# Patient Record
Sex: FEMALE | Race: ASIAN | NOT HISPANIC OR LATINO | Employment: UNEMPLOYED | URBAN - NONMETROPOLITAN AREA
[De-identification: names, ages, dates, MRNs, and addresses within clinical notes are randomized per-mention and may not be internally consistent; named-entity substitution may affect disease eponyms.]

---

## 2023-11-06 ENCOUNTER — OFFICE VISIT (OUTPATIENT)
Dept: FAMILY MEDICINE CLINIC | Age: 33
End: 2023-11-06
Payer: COMMERCIAL

## 2023-11-06 VITALS
BODY MASS INDEX: 24.3 KG/M2 | SYSTOLIC BLOOD PRESSURE: 112 MMHG | DIASTOLIC BLOOD PRESSURE: 80 MMHG | WEIGHT: 123.8 LBS | HEART RATE: 91 BPM | HEIGHT: 60 IN

## 2023-11-06 DIAGNOSIS — R79.0 LOW SERUM FERRITIN LEVEL: ICD-10-CM

## 2023-11-06 DIAGNOSIS — L30.9 ECZEMA, UNSPECIFIED TYPE: ICD-10-CM

## 2023-11-06 DIAGNOSIS — Z00.00 ANNUAL PHYSICAL EXAM: Primary | ICD-10-CM

## 2023-11-06 DIAGNOSIS — L65.9 HAIR LOSS: ICD-10-CM

## 2023-11-06 DIAGNOSIS — Z00.00 ENCOUNTER FOR MEDICAL EXAMINATION TO ESTABLISH CARE: ICD-10-CM

## 2023-11-06 PROCEDURE — 99385 PREV VISIT NEW AGE 18-39: CPT | Performed by: NURSE PRACTITIONER

## 2023-11-06 RX ORDER — TRIAMCINOLONE ACETONIDE 1 MG/G
1 CREAM TOPICAL 2 TIMES DAILY PRN
Qty: 80 G | Refills: 1 | Status: SHIPPED | OUTPATIENT
Start: 2023-11-06

## 2023-11-06 NOTE — PROGRESS NOTES
"Ainsley Jacob presents to [facility] with complaint of  Establish Care (Moved here from [place] ) and Annual Exam    SUBJECTIVE  History of Present Illness    Very pleasant patient is here to establish relations.  She moved here from [place].  She moved here due to her 's job relocating.  She does not have any children, has 3 husky dogs.  She is a stay-at-home wife.  Only chronic medical condition includes recent hair loss and eczema.  Patient says that she had labs completed last year that all came back normal except for low ferritin.  Patient was started on iron supplement for this reason.  Her ferritin level was 28, (patient was able to pull up lab report on her phone).  Other screening labs were normal.  Labs were completed last October.  Patient says she stopped iron supplement as she was not able to tell a difference in the amount of hair loss she was having.    Patient says she also uses Kenalog cream as needed for rash/eczema.  She is asking for this to be refilled today.    The following portions of the patient's history were reviewed and updated as appropriate: allergies, current medications, past family history, past medical history, past social history, past surgical history and problem list.  Patient's last Pap smear was last year and was normal.      OBJECTIVE  Vital Signs:   /80 (BP Location: Right arm, Patient Position: Sitting)   Pulse 91   Ht 152.4 cm (60\")   Wt 56.2 kg (123 lb 12.8 oz)   BMI 24.18 kg/m²       Physical Exam  Vitals reviewed.   Constitutional:       General: She is not in acute distress.     Appearance: Normal appearance. She is not ill-appearing.   HENT:      Head: Normocephalic and atraumatic.      Nose: Nose normal.      Mouth/Throat:      Mouth: Mucous membranes are moist.      Pharynx: Oropharynx is clear.   Cardiovascular:      Rate and Rhythm: Normal rate and regular rhythm.      Pulses: Normal pulses.      " Heart sounds: Normal heart sounds.   Pulmonary:      Effort: Pulmonary effort is normal.      Breath sounds: Normal breath sounds.   Musculoskeletal:      Cervical back: Neck supple.   Skin:     General: Skin is warm and dry.      Findings: Rash is crusting.      Comments: small area/quarter sized area of mild erythema and dry skin noted to lower abdomen.   Neurological:      General: No focal deficit present.      Mental Status: She is alert and oriented to person, place, and time. Mental status is at baseline.   Psychiatric:         Mood and Affect: Mood normal.         Behavior: Behavior normal.         Judgment: Judgment normal.              ASSESSMENT AND PLAN:  Diagnoses and all orders for this visit:    1. Annual physical exam (Primary)    2. Encounter for medical examination to establish care    3. Eczema, unspecified type  -     triamcinolone (KENALOG) 0.1 % cream; Apply 1 application  topically to the appropriate area as directed 2 (Two) Times a Day As Needed for Irritation or Rash.  Dispense: 80 g; Refill: 1    4. Low serum ferritin level    5. Hair loss        19 to 39: Counseling/Anticipatory Guidance Discussed: physical activity, healthy weight, misuse of tobacco, alcohol and drugs, dental health, mental health, and immunizations    Offered screening labs to patient, especially to recheck ferritin but she declines.  Also offered to refer to dermatology for concern over hair loss but she also declines.    Follow Up   Return in about 1 year (around 11/6/2024), or if symptoms worsen or fail to improve, for Annual physical. Patient to notify office with any acute concerns or issues.  Patient verbalizes understanding, agrees with plan of care and has no further questions upon discharge.     Patient was given instructions and counseling regarding her condition or for health maintenance advice. Please see specific information pulled into the AVS if appropriate.     Discussed the importance of following up  with any needed screening tests/labs/specialist appointments and any requested follow-up recommended by me today. Importance of maintaining follow-up discussed and patient accepts that missed appointments can delay diagnosis and potentially lead to worsening of conditions.    Part of this note may be an electronic transcription/translation of spoken language to printed text using the Dragon Dictation System.

## 2024-08-20 ENCOUNTER — LAB (OUTPATIENT)
Dept: LAB | Facility: HOSPITAL | Age: 34
End: 2024-08-20
Payer: COMMERCIAL

## 2024-08-20 ENCOUNTER — OFFICE VISIT (OUTPATIENT)
Dept: FAMILY MEDICINE CLINIC | Age: 34
End: 2024-08-20
Payer: COMMERCIAL

## 2024-08-20 VITALS
SYSTOLIC BLOOD PRESSURE: 119 MMHG | WEIGHT: 139.6 LBS | HEIGHT: 60 IN | BODY MASS INDEX: 27.41 KG/M2 | DIASTOLIC BLOOD PRESSURE: 88 MMHG | OXYGEN SATURATION: 100 % | HEART RATE: 82 BPM | TEMPERATURE: 98 F

## 2024-08-20 DIAGNOSIS — R63.5 WEIGHT GAIN: ICD-10-CM

## 2024-08-20 DIAGNOSIS — Z63.8 STRESS DUE TO FAMILY TENSION: ICD-10-CM

## 2024-08-20 DIAGNOSIS — Z01.419 ENCOUNTER FOR CERVICAL PAP SMEAR WITH PELVIC EXAM: Primary | ICD-10-CM

## 2024-08-20 LAB
ALBUMIN SERPL-MCNC: 4.5 G/DL (ref 3.5–5.2)
ALBUMIN/GLOB SERPL: 1.6 G/DL
ALP SERPL-CCNC: 54 U/L (ref 39–117)
ALT SERPL W P-5'-P-CCNC: 16 U/L (ref 1–33)
ANION GAP SERPL CALCULATED.3IONS-SCNC: 10 MMOL/L (ref 5–15)
AST SERPL-CCNC: 19 U/L (ref 1–32)
BASOPHILS # BLD AUTO: 0.05 10*3/MM3 (ref 0–0.2)
BASOPHILS NFR BLD AUTO: 0.9 % (ref 0–1.5)
BILIRUB SERPL-MCNC: 0.4 MG/DL (ref 0–1.2)
BUN SERPL-MCNC: 9 MG/DL (ref 6–20)
BUN/CREAT SERPL: 13.4 (ref 7–25)
CALCIUM SPEC-SCNC: 9.1 MG/DL (ref 8.6–10.5)
CHLORIDE SERPL-SCNC: 102 MMOL/L (ref 98–107)
CO2 SERPL-SCNC: 24 MMOL/L (ref 22–29)
CREAT SERPL-MCNC: 0.67 MG/DL (ref 0.57–1)
DEPRECATED RDW RBC AUTO: 40.5 FL (ref 37–54)
EGFRCR SERPLBLD CKD-EPI 2021: 116.3 ML/MIN/1.73
EOSINOPHIL # BLD AUTO: 0.21 10*3/MM3 (ref 0–0.4)
EOSINOPHIL NFR BLD AUTO: 3.8 % (ref 0.3–6.2)
ERYTHROCYTE [DISTWIDTH] IN BLOOD BY AUTOMATED COUNT: 12.2 % (ref 12.3–15.4)
GLOBULIN UR ELPH-MCNC: 2.9 GM/DL
GLUCOSE SERPL-MCNC: 94 MG/DL (ref 65–99)
HCT VFR BLD AUTO: 43.7 % (ref 34–46.6)
HGB BLD-MCNC: 14.5 G/DL (ref 12–15.9)
IMM GRANULOCYTES # BLD AUTO: 0.01 10*3/MM3 (ref 0–0.05)
IMM GRANULOCYTES NFR BLD AUTO: 0.2 % (ref 0–0.5)
LYMPHOCYTES # BLD AUTO: 2 10*3/MM3 (ref 0.7–3.1)
LYMPHOCYTES NFR BLD AUTO: 35.7 % (ref 19.6–45.3)
MCH RBC QN AUTO: 30 PG (ref 26.6–33)
MCHC RBC AUTO-ENTMCNC: 33.2 G/DL (ref 31.5–35.7)
MCV RBC AUTO: 90.5 FL (ref 79–97)
MONOCYTES # BLD AUTO: 0.38 10*3/MM3 (ref 0.1–0.9)
MONOCYTES NFR BLD AUTO: 6.8 % (ref 5–12)
NEUTROPHILS NFR BLD AUTO: 2.95 10*3/MM3 (ref 1.7–7)
NEUTROPHILS NFR BLD AUTO: 52.6 % (ref 42.7–76)
PLATELET # BLD AUTO: 339 10*3/MM3 (ref 140–450)
PMV BLD AUTO: 9.4 FL (ref 6–12)
POTASSIUM SERPL-SCNC: 3.9 MMOL/L (ref 3.5–5.2)
PROT SERPL-MCNC: 7.4 G/DL (ref 6–8.5)
RBC # BLD AUTO: 4.83 10*6/MM3 (ref 3.77–5.28)
SODIUM SERPL-SCNC: 136 MMOL/L (ref 136–145)
TSH SERPL DL<=0.05 MIU/L-ACNC: 2.18 UIU/ML (ref 0.27–4.2)
WBC NRBC COR # BLD AUTO: 5.6 10*3/MM3 (ref 3.4–10.8)

## 2024-08-20 PROCEDURE — 36415 COLL VENOUS BLD VENIPUNCTURE: CPT

## 2024-08-20 PROCEDURE — 87624 HPV HI-RISK TYP POOLED RSLT: CPT | Performed by: NURSE PRACTITIONER

## 2024-08-20 PROCEDURE — G0123 SCREEN CERV/VAG THIN LAYER: HCPCS | Performed by: NURSE PRACTITIONER

## 2024-08-20 PROCEDURE — 80050 GENERAL HEALTH PANEL: CPT

## 2024-08-20 SDOH — SOCIAL STABILITY - SOCIAL INSECURITY: OTHER SPECIFIED PROBLEMS RELATED TO PRIMARY SUPPORT GROUP: Z63.8

## 2024-08-20 NOTE — PROGRESS NOTES
"Chief Complaint  Gynecologic Exam (Also discuss weight gain )    Subjective      Ainsley Jacob is a 34 year old female that presents to [facility] for pap exam. She is also concerned for weight gain. States she exercises and does intermittent fasting. States she does not eat fast food and does not eat much bread or rice. States she gets on the treadmill and lifts weights. States she feels bloated and full quickly with every meal no matter what she eats. She denies nausea, vomiting, diarrhea or constipation. Reports daily bowel movements.  She does admit to a lot of stress from her mother. States she calls her daily and complains about various things even when she tells her she doesn't want to talk about them. She states that her mother also tells her she is fat and needs to lose weight every time she sees her in person. States family in [place] say the same things when she visits. States her parents also pull her into their arguments even though she has nothing to do with it. This seems to be causing her a lot of unnecessary stress/anxiety.      History of Present Illness    Current Outpatient Medications   Medication Instructions    triamcinolone (KENALOG) 0.1 % cream 1 application , Topical, 2 Times Daily PRN       The following portions of the patient's history were reviewed and updated as appropriate: allergies, current medications, past family history, past medical history, past social history, past surgical history, and problem list.    Objective   Vital Signs:   /88 (BP Location: Right arm, Patient Position: Sitting)   Pulse 82   Temp 98 °F (36.7 °C) (Oral)   Ht 152.4 cm (60\")   Wt 63.3 kg (139 lb 9.6 oz)   SpO2 100%   BMI 27.26 kg/m²     Wt Readings from Last 3 Encounters:   08/20/24 63.3 kg (139 lb 9.6 oz)   11/06/23 56.2 kg (123 lb 12.8 oz)     BP Readings from Last 3 Encounters:   08/20/24 119/88   11/06/23 112/80     Physical Exam  Vitals and nursing note " reviewed. Exam conducted with a chaperone present (jose d blue MA).   Constitutional:       Appearance: Normal appearance. She is not ill-appearing.   HENT:      Head: Normocephalic and atraumatic.   Cardiovascular:      Rate and Rhythm: Normal rate and regular rhythm.      Heart sounds: Normal heart sounds.   Pulmonary:      Effort: Pulmonary effort is normal.   Chest:   Breasts:     Breasts are symmetrical.      Right: Normal.      Left: Normal.   Genitourinary:     General: Normal vulva.      Exam position: Lithotomy position.      Pubic Area: No rash.       Vagina: Vaginal discharge present.      Cervix: Normal. No friability or erythema.      Uterus: Normal.       Adnexa: Right adnexa normal and left adnexa normal.      Rectum: Normal.   Lymphadenopathy:      Upper Body:      Right upper body: No axillary adenopathy.      Left upper body: No axillary adenopathy.   Skin:     General: Skin is warm and dry.      Capillary Refill: Capillary refill takes less than 2 seconds.   Neurological:      Mental Status: She is alert and oriented to person, place, and time.   Psychiatric:         Mood and Affect: Mood normal. Affect is tearful.         Behavior: Behavior normal.      Comments: Tearful when talking about her mother          Result Review :  The following data was reviewed by: SANDY Banda on 08/20/2024:      Common labs          8/20/2024    10:49   Common Labs   WBC 5.60    Hemoglobin 14.5    Hematocrit 43.7    Platelets 339        Lab Results (last 72 hours)       Procedure Component Value Units Date/Time    Comprehensive metabolic panel [[id number]] Collected: 08/20/24 1049    Specimen: Blood Updated: 08/20/24 1049    TSH Rfx On Abnormal To Free T4 [[id number]] Collected: 08/20/24 1049    Specimen: Blood Updated: 08/20/24 1049    CBC w AUTO Differential [[id number]]  (Abnormal) Collected: 08/20/24 1049    Specimen: Blood Updated: 08/20/24 1059    Narrative:      The following orders were  "created for panel order CBC w AUTO Differential.  Procedure                               Abnormality         Status                     ---------                               -----------         ------                     CBC Auto Differential[[id number]]        Abnormal            Final result                 Please view results for these tests on the individual orders.    CBC Auto Differential [[id number]]  (Abnormal) Collected: 08/20/24 1049    Specimen: Blood Updated: 08/20/24 1059     WBC 5.60 10*3/mm3      RBC 4.83 10*6/mm3      Hemoglobin 14.5 g/dL      Hematocrit 43.7 %      MCV 90.5 fL      MCH 30.0 pg      MCHC 33.2 g/dL      RDW 12.2 %      RDW-SD 40.5 fl      MPV 9.4 fL      Platelets 339 10*3/mm3      Neutrophil % 52.6 %      Lymphocyte % 35.7 %      Monocyte % 6.8 %      Eosinophil % 3.8 %      Basophil % 0.9 %      Immature Grans % 0.2 %      Neutrophils, Absolute 2.95 10*3/mm3      Lymphocytes, Absolute 2.00 10*3/mm3      Monocytes, Absolute 0.38 10*3/mm3      Eosinophils, Absolute 0.21 10*3/mm3      Basophils, Absolute 0.05 10*3/mm3      Immature Grans, Absolute 0.01 10*3/mm3     IGP, Aptima HPV, Rfx 16 / 18,45 [[id number]] Collected: 08/20/24 1202    Specimen: ThinPrep Vial from Cervix Updated: 08/20/24 1227             No Images in the past 120 days found..    No results found for: \"SARSANTIGEN\", \"COVID19\", \"RAPFLUA\", \"RAPFLUB\", \"FLUAAG\", \"FLUABDAG\", \"FLU\", \"FLUBAG\", \"RAPSCRN\", \"STREPAAG\", \"RSV\", \"POCPREGUR\", \"MONOSPOT\", \"INR\", \"LEADCAPBLD\", \"POCLEAD\", \"BILIRUBINUR\", \"POCGLU\"    Procedures        Assessment and Plan   Diagnoses and all orders for this visit:    1. Encounter for cervical Pap smear with pelvic exam (Primary)  -     IGP, Aptima HPV, Rfx 16 / 18,45; Future  -     IGP, Aptima HPV, Rfx 16 / 18,45    2. Weight gain  -     TSH Rfx On Abnormal To Free T4; Future  -     CBC w AUTO Differential; Future  -     Comprehensive metabolic panel; Future    3. Stress due to family " tension  Comments:  advised patient to set healthy boundaries with her mother. Encouraged counseling as well since mental health can impact physical health.                  There are no discontinued medications.       Follow Up   No follow-ups on file.  Patient was given instructions and counseling regarding her condition or for health maintenance advice. Please see specific information pulled into the AVS if appropriate.       Mell Kilpatrick, SANDY  08/20/24  14:18 EDT

## 2024-08-20 NOTE — PATIENT INSTRUCTIONS
Please seek counseling/therapy to help manage your stress and relationship with your mother. I believe this would be very beneficial as mental health affects physical health.    Also as discussed, please log your meals in an martha such as myfitness pal to keep up with your calories, carbs and protein. Goal protein intake of at least 100 grams per day. Also, be sure your are drinking plenty of water.

## 2024-08-24 LAB
CYTOLOGIST CVX/VAG CYTO: NORMAL
CYTOLOGY CVX/VAG DOC CYTO: NORMAL
CYTOLOGY CVX/VAG DOC THIN PREP: NORMAL
DX ICD CODE: NORMAL
HPV GENOTYPE REFLEX: NORMAL
HPV I/H RISK 4 DNA CVX QL PROBE+SIG AMP: NEGATIVE
Lab: NORMAL
OTHER STN SPEC: NORMAL
STAT OF ADQ CVX/VAG CYTO-IMP: NORMAL

## 2025-01-29 DIAGNOSIS — L30.9 ECZEMA, UNSPECIFIED TYPE: ICD-10-CM

## 2025-01-29 RX ORDER — TRIAMCINOLONE ACETONIDE 1 MG/G
CREAM TOPICAL
Qty: 80 G | Refills: 1 | OUTPATIENT
Start: 2025-01-29

## 2025-06-04 ENCOUNTER — OFFICE VISIT (OUTPATIENT)
Dept: FAMILY MEDICINE CLINIC | Age: 35
End: 2025-06-04
Payer: COMMERCIAL

## 2025-06-04 VITALS
BODY MASS INDEX: 25.6 KG/M2 | SYSTOLIC BLOOD PRESSURE: 123 MMHG | OXYGEN SATURATION: 100 % | HEIGHT: 60 IN | TEMPERATURE: 98 F | DIASTOLIC BLOOD PRESSURE: 85 MMHG | HEART RATE: 87 BPM | WEIGHT: 130.4 LBS

## 2025-06-04 DIAGNOSIS — R13.10 DYSPHAGIA, UNSPECIFIED TYPE: ICD-10-CM

## 2025-06-04 DIAGNOSIS — Z00.00 ANNUAL PHYSICAL EXAM: Primary | ICD-10-CM

## 2025-06-04 DIAGNOSIS — Z29.89 NEED FOR MALARIA PROPHYLAXIS: ICD-10-CM

## 2025-06-04 DIAGNOSIS — L30.9 ECZEMA, UNSPECIFIED TYPE: ICD-10-CM

## 2025-06-04 RX ORDER — DOXYCYCLINE HYCLATE 100 MG
100 TABLET ORAL DAILY
Qty: 60 TABLET | Refills: 0 | Status: SHIPPED | OUTPATIENT
Start: 2025-06-04

## 2025-06-04 RX ORDER — OMEPRAZOLE 40 MG/1
40 CAPSULE, DELAYED RELEASE ORAL DAILY
Qty: 30 CAPSULE | Refills: 0 | Status: SHIPPED | OUTPATIENT
Start: 2025-06-04

## 2025-06-04 RX ORDER — TRIAMCINOLONE ACETONIDE 1 MG/G
1 CREAM TOPICAL 2 TIMES DAILY PRN
Qty: 80 G | Refills: 1 | Status: SHIPPED | OUTPATIENT
Start: 2025-06-04

## 2025-06-04 NOTE — PROGRESS NOTES
Chief Complaint  Ainsley Jacob presents to [facility] for Annual Exam    Subjective          History of Present Illness    Ainsley is here today for preventive exam and to establish with new providers prior provider left office.  Declines COVID, Tdap vaccines.  Pap smear 8/20/2024 normal cytology, normal HPV.  Former smoker.  Quit in 2018.  She is requesting refill on her triamcinolone cream that she uses for eczema. She has been without her medication for several months.   She is going to be traveling to [facility] to visit family in 3 months for 1 month.   Feels like pills are getting stuck in throat. This just started 1-2 months ago.    Review of Systems   Constitutional:  Negative for chills and fever.   HENT:  Negative for ear pain and sore throat.    Eyes:  Negative for blurred vision and redness.   Respiratory:  Negative for shortness of breath and wheezing.    Cardiovascular:  Negative for chest pain and palpitations.   Gastrointestinal:  Negative for abdominal pain and vomiting.   Genitourinary:  Negative for frequency and urgency.   Skin:  Positive for rash (eczema).   Neurological:  Negative for seizures and syncope.   Psychiatric/Behavioral:  Negative for suicidal ideas and depressed mood.          No Known Allergies   History reviewed. No pertinent past medical history.  Current Outpatient Medications   Medication Sig Dispense Refill    triamcinolone (KENALOG) 0.1 % cream Apply 1 Application topically to the appropriate area as directed 2 (Two) Times a Day As Needed for Irritation or Rash. 80 g 1    doxycycline (VIBRAMYICN) 100 MG tablet Take 1 tablet by mouth Daily. Start 1-2 days prior to exposure and continue for 4 weeks after exposure. No dairy products within 2 hours of a dose 60 tablet 0    omeprazole (priLOSEC) 40 MG capsule Take 1 capsule by mouth Daily. 30 capsule 0     No current facility-administered medications for this visit.     History reviewed. No pertinent  "surgical history.   Social History     Tobacco Use    Smoking status: Former     Current packs/day: 0.00     Average packs/day: 0.5 packs/day for 14.0 years (7.0 ttl pk-yrs)     Types: Cigarettes     Start date:      Quit date: 2018     Years since quittin.4    Smokeless tobacco: Never   Vaping Use    Vaping status: Former    Start date: 2020    Quit date: 2018    Substances: Nicotine    Devices: Disposable   Substance Use Topics    Alcohol use: Not Currently    Drug use: Never     Family History   Problem Relation Age of Onset    Hypertension Mother     Diabetes Mother     Diabetes Father     Hypertension Father      Health Maintenance Due   Topic Date Due    ANNUAL PHYSICAL  2024      Immunization History   Administered Date(s) Administered    HPV Quadrivalent 2007, 2009    Hepatitis B Adolescent High Risk Infant 08/10/1999    IPV 1999    MMR 08/10/1999    Td (TDVAX) 1999    Varicella 08/10/1999        Objective     Vitals:    25 1520   BP: 123/85   Pulse: 87   Temp: 98 °F (36.7 °C)   TempSrc: Oral   SpO2: 100%   Weight: 59.1 kg (130 lb 6.4 oz)   Height: 152.4 cm (60\")     Body mass index is 25.47 kg/m².                No results found.    Physical Exam  Vitals reviewed.   Constitutional:       General: She is not in acute distress.     Appearance: Normal appearance. She is well-developed.   HENT:      Head: Normocephalic and atraumatic.      Right Ear: Hearing normal.      Left Ear: Hearing normal.      Mouth/Throat:      Mouth: Mucous membranes are moist.   Eyes:      Extraocular Movements: Extraocular movements intact.      Pupils: Pupils are equal, round, and reactive to light.   Cardiovascular:      Rate and Rhythm: Normal rate and regular rhythm.      Pulses: Normal pulses.      Heart sounds: Normal heart sounds.   Pulmonary:      Effort: Pulmonary effort is normal.      Breath sounds: Normal breath sounds.   Abdominal:      General: Bowel sounds are " normal.      Palpations: Abdomen is soft.      Tenderness: There is no abdominal tenderness.   Musculoskeletal:         General: Normal range of motion.      Cervical back: Normal range of motion and neck supple.   Skin:     General: Skin is warm and dry.   Neurological:      Mental Status: She is alert and oriented to person, place, and time.   Psychiatric:         Mood and Affect: Mood normal.           Result Review :                               Assessment and Plan      Assessment & Plan  Annual physical exam  Appropriate screenings and vaccinations were reviewed with the pt and offered as indicated.  Pt counseled on healthy lifestyle including healthy diet, exercise.         Eczema, unspecified type  Refill proved on triamcinolone.   Orders:    triamcinolone (KENALOG) 0.1 % cream; Apply 1 Application topically to the appropriate area as directed 2 (Two) Times a Day As Needed for Irritation or Rash.    Dysphagia, unspecified type  Trial Ppi. Will consider need for possible scope if persistent or worsening symptoms.   Orders:    omeprazole (priLOSEC) 40 MG capsule; Take 1 capsule by mouth Daily.    Need for malaria prophylaxis  Malaria prophylaxis sent. Also reviewed CDC travel vaccine recommendations with patient. She will check on status of vaccines and can return to update as needed.   Orders:    doxycycline (VIBRAMYICN) 100 MG tablet; Take 1 tablet by mouth Daily. Start 1-2 days prior to exposure and continue for 4 weeks after exposure. No dairy products within 2 hours of a dose              Follow Up     Return in about 1 year (around 6/4/2026) for Annual physical.

## 2025-06-12 DIAGNOSIS — L30.9 ECZEMA, UNSPECIFIED TYPE: ICD-10-CM

## 2025-06-12 RX ORDER — TRIAMCINOLONE ACETONIDE 1 MG/G
1 CREAM TOPICAL 2 TIMES DAILY PRN
Qty: 80 G | Refills: 2 | Status: SHIPPED | OUTPATIENT
Start: 2025-06-12

## 2025-06-30 DIAGNOSIS — R13.10 DYSPHAGIA, UNSPECIFIED TYPE: ICD-10-CM

## 2025-07-01 RX ORDER — OMEPRAZOLE 40 MG/1
40 CAPSULE, DELAYED RELEASE ORAL DAILY
Qty: 90 CAPSULE | Refills: 0 | Status: SHIPPED | OUTPATIENT
Start: 2025-07-01

## 2025-08-19 ENCOUNTER — OFFICE VISIT (OUTPATIENT)
Dept: FAMILY MEDICINE CLINIC | Age: 35
End: 2025-08-19
Payer: COMMERCIAL

## 2025-08-19 VITALS
HEIGHT: 60 IN | SYSTOLIC BLOOD PRESSURE: 118 MMHG | BODY MASS INDEX: 26.03 KG/M2 | DIASTOLIC BLOOD PRESSURE: 82 MMHG | WEIGHT: 132.6 LBS | HEART RATE: 100 BPM | TEMPERATURE: 98.1 F | OXYGEN SATURATION: 100 %

## 2025-08-19 DIAGNOSIS — K64.9 HEMORRHOIDS, UNSPECIFIED HEMORRHOID TYPE: Primary | ICD-10-CM

## 2025-08-19 DIAGNOSIS — L85.8 KP (KERATOSIS PILARIS): ICD-10-CM

## 2025-08-19 DIAGNOSIS — K59.00 CONSTIPATION, UNSPECIFIED CONSTIPATION TYPE: ICD-10-CM

## 2025-08-19 DIAGNOSIS — Z29.89 NEED FOR MALARIA PROPHYLAXIS: ICD-10-CM

## 2025-08-19 DIAGNOSIS — L30.9 ECZEMA, UNSPECIFIED TYPE: ICD-10-CM

## 2025-08-19 PROCEDURE — 99214 OFFICE O/P EST MOD 30 MIN: CPT | Performed by: NURSE PRACTITIONER

## 2025-08-19 RX ORDER — TRIAMCINOLONE ACETONIDE 5 MG/G
1 OINTMENT TOPICAL 2 TIMES DAILY
Qty: 45 G | Refills: 1 | Status: SHIPPED | OUTPATIENT
Start: 2025-08-19

## 2025-08-19 RX ORDER — DOCUSATE SODIUM 100 MG/1
100 CAPSULE, LIQUID FILLED ORAL DAILY
Qty: 30 CAPSULE | Refills: 0 | Status: SHIPPED | OUTPATIENT
Start: 2025-08-19

## 2025-08-19 RX ORDER — DOXYCYCLINE HYCLATE 100 MG
TABLET ORAL
Qty: 60 TABLET | Refills: 0 | OUTPATIENT
Start: 2025-08-19

## 2025-08-19 RX ORDER — PRAMOXINE HYDROCHLORIDE HYDROCORTISONE ACETATE 100; 100 MG/10G; MG/10G
1 AEROSOL, FOAM TOPICAL 2 TIMES DAILY
Qty: 1 EACH | Refills: 0 | Status: SHIPPED | OUTPATIENT
Start: 2025-08-19 | End: 2025-08-26